# Patient Record
Sex: MALE | Race: ASIAN | Employment: FULL TIME | URBAN - METROPOLITAN AREA
[De-identification: names, ages, dates, MRNs, and addresses within clinical notes are randomized per-mention and may not be internally consistent; named-entity substitution may affect disease eponyms.]

---

## 2022-02-01 LAB — HBA1C MFR BLD HPLC: 6.3 %

## 2022-10-14 ENCOUNTER — TELEPHONE (OUTPATIENT)
Dept: INTERNAL MEDICINE CLINIC | Age: 52
End: 2022-10-14

## 2022-10-14 ENCOUNTER — OFFICE VISIT (OUTPATIENT)
Dept: INTERNAL MEDICINE CLINIC | Age: 52
End: 2022-10-14
Payer: COMMERCIAL

## 2022-10-14 VITALS
WEIGHT: 180 LBS | TEMPERATURE: 97.2 F | OXYGEN SATURATION: 99 % | HEART RATE: 72 BPM | DIASTOLIC BLOOD PRESSURE: 100 MMHG | BODY MASS INDEX: 27.28 KG/M2 | HEIGHT: 68 IN | SYSTOLIC BLOOD PRESSURE: 140 MMHG

## 2022-10-14 DIAGNOSIS — E11.69 TYPE 2 DIABETES MELLITUS WITH HYPERLIPIDEMIA (HCC): ICD-10-CM

## 2022-10-14 DIAGNOSIS — E78.5 TYPE 2 DIABETES MELLITUS WITH HYPERLIPIDEMIA (HCC): ICD-10-CM

## 2022-10-14 DIAGNOSIS — K14.6 SORENESS OF TONGUE: ICD-10-CM

## 2022-10-14 DIAGNOSIS — L03.012 PARONYCHIA OF LEFT MIDDLE FINGER: ICD-10-CM

## 2022-10-14 DIAGNOSIS — I10 ESSENTIAL HYPERTENSION: Primary | ICD-10-CM

## 2022-10-14 DIAGNOSIS — E78.2 MIXED HYPERLIPIDEMIA: ICD-10-CM

## 2022-10-14 PROCEDURE — 99213 OFFICE O/P EST LOW 20 MIN: CPT | Performed by: INTERNAL MEDICINE

## 2022-10-14 RX ORDER — LOSARTAN POTASSIUM 50 MG/1
50 TABLET ORAL DAILY
COMMUNITY

## 2022-10-14 RX ORDER — LOVASTATIN 10 MG/1
10 TABLET ORAL
COMMUNITY

## 2022-10-14 RX ORDER — METFORMIN HYDROCHLORIDE 500 MG/1
1500 TABLET, FILM COATED, EXTENDED RELEASE ORAL
COMMUNITY

## 2022-10-14 RX ORDER — METOPROLOL SUCCINATE 25 MG/1
25 TABLET, EXTENDED RELEASE ORAL DAILY
COMMUNITY
End: 2022-11-03

## 2022-10-14 RX ORDER — GLIMEPIRIDE 2 MG/1
2 TABLET ORAL
COMMUNITY

## 2022-10-14 RX ORDER — SIMVASTATIN 10 MG/1
10 TABLET, FILM COATED ORAL
COMMUNITY
End: 2022-10-14 | Stop reason: ALTCHOICE

## 2022-10-14 NOTE — TELEPHONE ENCOUNTER
Keo Cas is called and informed her  Albina Pedraza who has lesions in his mouth and requested an appointment. Appointment given for 200 today.

## 2022-10-14 NOTE — PROGRESS NOTES
Chief Complaint   Patient presents with    Mouth Pain    Follow Up Chronic Condition     1. Have you been to the ER, urgent care clinic since your last visit? Hospitalized since your last visit? No    2. Have you seen or consulted any other health care providers outside of the [address] [place] [address] since your last visit? Include any pap smears or colon screening.  No

## 2022-10-14 NOTE — PROGRESS NOTES
[address] [facility]  [address]  [place], [facility]  Phone: [phone]      Stevan Castro (: 1970) is a 50 y.o. male, established patient, here for evaluation of the following chief complaint(s):  Mouth Pain and Follow Up Chronic Condition         SUBJECTIVE/OBJECTIVE:  HPI:  Patient is here for tongue pain, he states that it started about a week ago. He has pain with hot/cold liquids and when he is brushing his tongue. He has not had 2nd COVID booster and no Flu shot for . He states that his sugar has been good and he would like to d/c some of diabetic medicines. He would like refills on his medicines. He has not had his blood pressure medicine today and he has been under some stress at work . Just took the medicine a few minutes back. Patient stated he had pain and swelling of the left and middle finger  3 weeks back[facility] with the doxy[facility]ine the pain and swelling is better. Prior to Admission medications    Medication Sig Start Date End Date Taking? Authorizing Provider   [facility] ER) 500 mg TG24 24 hour tablet Take 1,500 mg by mouth. Take 1 in the AM and 2 in the PM   Yes Provider, Historical   lovastatin (MEVACOR) 10 mg tablet Take 10 mg by mouth nightly. Yes Provider, Historical   glimepiride (AMARYL) 2 mg tablet Take 2 mg by mouth every morning. Yes Provider, Historical   metoprolol succinate (TOPROL-XL) 25 mg XL tablet Take 25 mg by mouth daily. Yes Provider, Historical   losartan (COZAAR) 50 mg tablet Take 50 mg by mouth daily. Yes Provider, Historical        No Known Allergies     Past Medical History:   Diagnosis Date    COVID     Essential hypertension     Mixed hyperlipidemia     Numbness of right hand     Type 2 diabetes mellitus with hyperlipidemia (HCC)         Family History   Problem Relation Age of Onset    Hypertension Mother         History reviewed.  No pertinent surgical history. Review of Systems   Constitutional:  Negative for chills and fever. HENT:  Negative for congestion, ear pain, nosebleeds, sinus pain, sore throat and tinnitus. Soreness over the tongue. Eyes:  Negative for redness. Respiratory:  Negative for cough and shortness of breath. Cardiovascular:  Negative for chest pain and palpitations. Gastrointestinal:  Negative for abdominal pain, diarrhea, nausea and vomiting. Endocrine: Negative for cold intolerance and polyuria. Genitourinary:  Negative for dysuria and hematuria. Musculoskeletal:  Negative for back pain and neck pain. Skin:  Negative for rash. The pain and swelling the nail fold of the left middle finger 3 weeks back, [facility] with [facility]xy[facility]ine   Neurological:  Negative for dizziness and headaches. Psychiatric/Behavioral:  Negative for suicidal ideas. The patient is not nervous/anxious. BP (!) 140/100 (BP 1 Location: Left upper arm, BP Patient Position: Sitting)   Pulse 72   Temp 97.2 °F (36.2 °C) (Temporal)   Ht 5' 8\" (1.727 m)   Wt 180 lb (81.6 kg)   SpO2 99%   BMI 27.37 kg/m²      Physical Exam  Constitutional:       Appearance: Normal appearance. HENT:      Head: Normocephalic and atraumatic. Right Ear: Tympanic membrane and external ear normal.      Left Ear: Tympanic membrane and external ear normal.      Nose: Nose normal.      Mouth/Throat:      Mouth: Mucous membranes are moist.      Comments: No ulcers noted over the buccal mucosa, mild loss of papilloma over the dorsum of the tongue. Eyes:      Extraocular Movements: Extraocular movements intact. Pupils: Pupils are equal, round, and reactive to light. Cardiovascular:      Rate and Rhythm: Normal rate. Pulmonary:      Effort: Pulmonary effort is normal.      Breath sounds: Normal breath sounds. Abdominal:      Palpations: Abdomen is soft. Tenderness: There is no abdominal tenderness. Musculoskeletal:      Cervical back: Normal range of motion and neck supple. Right lower leg: No edema. Left lower leg: No edema. Skin:     General: Skin is warm and dry. Comments: Minimal swelling lateral lateral nail fold of left middle finger, no redness no tenderness no drainage. Neurological:      General: No focal deficit present. Mental Status: He is alert and oriented to person, place, and time. Psychiatric:         Mood and Affect: Mood normal.       ASSESSMENT/PLAN:  Below is the assessment and plan developed based on review of pertinent history, physical exam, labs, studies, and medications. 1. Essential hypertension  Comments:  Pressure is elevated currently, patient just took his medication and is coming from work and has been under stress at work. Advised to continue current medicat  2. Soreness of tongue  Comments:  Soreness of the tongue is most likely due to the recent use of antibiotics, advised to use saline gargles and honey locally. 3. Type 2 diabetes mellitus with hyperlipidemia ([facility] [address]  Comments:  Advised to continue current medications, monitor blood sugar at home, will recheck A1c well controlled we will try to cut down medication. Orders:  -     HEMOGLOBIN A1C WITH EAG  -     MICROALBUMIN, UR, RAND W/ MICROALB/CREAT RATIO  -     METABOLIC PANEL, COMPREHENSIVE  4. Mixed hyperlipidemia  Comments:  Advised to continue low-fat diet, lovastatin, will recheck labs  Orders:  -     LIPID PANEL  5. Paronychia of left middle finger  Comments:  Resolving, had a treatment with doxycycline. Return in about 6 weeks (around 11/25/2022). There are no Patient Instructions on file for this visit.      Health Maintenance Due   Topic Date Due    Hepatitis C Screening  Never done    COVID-19 Vaccine (1) Never done    Pneumococcal 0-64 years (1 - PCV) Never done    Foot Exam Q1  Never done    A1C test (Diabetic or Prediabetic)  Never done    MICROALBUMIN Q1  Never done    Eye Exam Retinal or Dilated  Never done    Lipid Screen  Never done    Hepatitis B Vaccine (1 of 3 - Risk 3-dose series) Never done    DTaP/Tdap/Td series (1 - Tdap) Never done    Colorectal Cancer Screening Combo  Never done    Shingrix Vaccine Age 50> (1 of 2) Never done    Flu Vaccine (1) Never done        Aspects of this note may have been generated using voice recognition software. Despite editing, there may be unrecognized errors. An electronic signature was used to authenticate this note.   -- Maribel Vega MD

## 2022-11-03 RX ORDER — METOPROLOL SUCCINATE 25 MG/1
TABLET, EXTENDED RELEASE ORAL
Qty: 90 TABLET | Refills: 1 | Status: SHIPPED | OUTPATIENT
Start: 2022-11-03

## 2023-02-23 ENCOUNTER — OFFICE VISIT (OUTPATIENT)
Dept: INTERNAL MEDICINE CLINIC | Age: 53
End: 2023-02-23
Payer: COMMERCIAL

## 2023-02-23 VITALS — HEART RATE: 84 BPM | SYSTOLIC BLOOD PRESSURE: 148 MMHG | DIASTOLIC BLOOD PRESSURE: 100 MMHG

## 2023-02-23 DIAGNOSIS — Z12.11 COLON CANCER SCREENING: ICD-10-CM

## 2023-02-23 DIAGNOSIS — E78.2 MIXED HYPERLIPIDEMIA: ICD-10-CM

## 2023-02-23 DIAGNOSIS — E83.52 HYPERCALCEMIA: Primary | ICD-10-CM

## 2023-02-23 DIAGNOSIS — E11.9 ENCOUNTER FOR DIABETIC FOOT EXAM (HCC): ICD-10-CM

## 2023-02-23 DIAGNOSIS — R41.0 TRANSIENT CONFUSION: Primary | ICD-10-CM

## 2023-02-23 DIAGNOSIS — Z11.59 ENCOUNTER FOR HEPATITIS C SCREENING TEST FOR LOW RISK PATIENT: ICD-10-CM

## 2023-02-23 DIAGNOSIS — E11.69 TYPE 2 DIABETES MELLITUS WITH HYPERLIPIDEMIA (HCC): ICD-10-CM

## 2023-02-23 DIAGNOSIS — I10 ESSENTIAL HYPERTENSION: ICD-10-CM

## 2023-02-23 DIAGNOSIS — Z12.5 SCREENING PSA (PROSTATE SPECIFIC ANTIGEN): ICD-10-CM

## 2023-02-23 DIAGNOSIS — E78.5 TYPE 2 DIABETES MELLITUS WITH HYPERLIPIDEMIA (HCC): ICD-10-CM

## 2023-02-23 DIAGNOSIS — E83.52 HYPERCALCEMIA: ICD-10-CM

## 2023-02-23 PROBLEM — U07.1 COVID: Status: ACTIVE | Noted: 2023-02-23

## 2023-02-23 PROCEDURE — 99214 OFFICE O/P EST MOD 30 MIN: CPT | Performed by: INTERNAL MEDICINE

## 2023-02-23 PROCEDURE — 3077F SYST BP >= 140 MM HG: CPT | Performed by: INTERNAL MEDICINE

## 2023-02-23 PROCEDURE — 3080F DIAST BP >= 90 MM HG: CPT | Performed by: INTERNAL MEDICINE

## 2023-02-23 PROCEDURE — 3044F HG A1C LEVEL LT 7.0%: CPT | Performed by: INTERNAL MEDICINE

## 2023-02-23 RX ORDER — ASPIRIN 81 MG/1
81 TABLET ORAL DAILY
COMMUNITY

## 2023-02-23 RX ORDER — METOPROLOL SUCCINATE 50 MG/1
50 TABLET, EXTENDED RELEASE ORAL DAILY
Qty: 90 TABLET | Refills: 1 | Status: SHIPPED | OUTPATIENT
Start: 2023-02-23

## 2023-02-23 RX ORDER — ZOSTER VACCINE RECOMBINANT, ADJUVANTED 50 MCG/0.5
0.5 KIT INTRAMUSCULAR ONCE
Qty: 1 EACH | Refills: 1 | Status: SHIPPED | OUTPATIENT
Start: 2023-02-23 | End: 2023-02-23

## 2023-02-23 RX ORDER — LOSARTAN POTASSIUM 100 MG/1
100 TABLET ORAL DAILY
Qty: 90 TABLET | Refills: 1 | Status: SHIPPED | OUTPATIENT
Start: 2023-02-23

## 2023-02-23 NOTE — PROGRESS NOTES
Chief Complaint   Patient presents with    ED Follow-up    Follow Up Chronic Condition     1. \"Have you been to the ER, urgent care clinic since your last visit? Hospitalized since your last visit? \"  [facility]     2. \"Have you seen or consulted any other health care providers outside of the [address] [place] [address] since your last visit? \" No     3. For patients aged 39-70: Has the patient had a colonoscopy / FIT/ Cologuard? No      If the patient is female:    4. For patients aged 41-77: Has the patient had a mammogram within the past 2 years? NA - based on age or sex      11. For patients aged 21-65: Has the patient had a pap smear?  NA - based on age or sex

## 2023-02-23 NOTE — PROGRESS NOTES
[address] [facility]  [address]  [place], [facility]  Phone: [phone]      Elmo Stanley (: 1970) is a 50 y.o. male, established patient, here for evaluation of the following chief complaint(s):  ED Follow-up, Follow Up Chronic Condition, and Labs (Blood test at [facility] on 2023)         SUBJECTIVE/OBJECTIVE:  HPI:  Patient is here for ER discharge follow up, he was [facility] on 2023. He went to the ER for confusion. He did not know who his family was an felt generally confused. 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 A family friend came and checked his blood pressure and it was 190/102 him to the emergency room. It had resolved by the time he got the ER but not 100%. He had a consult with Dr. Lety Hogue and he had an EKG, CT head, and blood work. He was advised to start 81mg ASA every day until he could see his PCP. He has not had anymore confusion. He will see Dr. Lety Hogue in March. Is taking 100 mg of losartan for the last 2 weeks and blood pressure at home ranges from 130/80, 140/90  and he does need a refill on Losartan. Stated he has been under stress at work, and from school as he is doing [employer] course. Patient stated he forgot to take his blood pressure medications 3 to 4 days prior to the episode. Patient stated he had an appointment with Dr. Hernan Wright for colonoscopy but as he had to go for a training he had to to cancel that appointment. Patient stated he had a blood test on  at [facility]. Patient stated he is not taking Jardiance as he could not tolerate it. Prior to Admission medications    Medication Sig Start Date End Date Taking? Authorizing Provider   aspirin delayed-release 81 mg tablet Take 81 mg by mouth daily.    Yes Provider, Historical   varicella-zoster recombinant, PF, (Shingrix, PF,) 50 mcg/0.5 mL susr injection 0.5 mL by IntraMUSCular route once for 1 dose. 2/23/23 2/23/23 Yes Janessa Rodriguez MD   metoprolol succinate (TOPROL-XL) 50 mg XL tablet Take 1 Tablet by mouth daily. 2/23/23  Yes Janessa Rodriguez MD   losartan (COZAAR) 100 mg tablet Take 1 Tablet by mouth daily. 2/23/23  Yes Janessa Rodriguez MD   [facility] ER) 500 mg TG24 24 hour tablet Take 1,500 mg by mouth. Take 1 in the AM and 2 in the PM   Yes Provider, Historical   lovastatin (MEVACOR) 10 mg tablet Take 10 mg by mouth nightly. Yes Provider, Historical   glimepiride (AMARYL) 2 mg tablet Take 2 mg by mouth every morning. Yes Provider, Historical        No Known Allergies     Past Medical History:   Diagnosis Date    COVID     Essential hypertension     Mixed hyperlipidemia     Numbness of right hand     Type 2 diabetes mellitus with hyperlipidemia (HCC)         Family History   Problem Relation Age of Onset    Hypertension Mother         History reviewed. No pertinent surgical history. Review of Systems   Constitutional:  Negative for chills and fever. HENT:  Negative for congestion, ear pain, nosebleeds, sinus pain, sore throat and tinnitus. Eyes:  Negative for redness. Respiratory:  Negative for cough and shortness of breath. Cardiovascular:  Negative for chest pain and palpitations. Gastrointestinal:  Negative for abdominal pain, diarrhea, nausea and vomiting. Endocrine: Negative for cold intolerance and polyuria. Genitourinary:  Negative for dysuria and hematuria. Musculoskeletal:  Negative for back pain and neck pain. Skin:  Negative for rash. Neurological:  Negative for dizziness and headaches. Psychiatric/Behavioral: Negative. BP (!) 148/100   Pulse 84      Physical Exam  Constitutional:       General: He is not in acute distress. Appearance: Normal appearance. HENT:      Head: Normocephalic and atraumatic.       Right Ear: External ear normal.      Left Ear: External ear normal.      Nose: Nose normal.      Mouth/Throat:      Mouth: Mucous membranes are moist.   Eyes:      Extraocular Movements: Extraocular movements intact. Pupils: Pupils are equal, round, and reactive to light. Cardiovascular:      Rate and Rhythm: Normal rate and regular rhythm. Pulmonary:      Effort: Pulmonary effort is normal.      Breath sounds: Normal breath sounds. Abdominal:      General: Bowel sounds are normal.      Palpations: Abdomen is soft. Tenderness: There is no abdominal tenderness. Musculoskeletal:         General: Normal range of motion. Cervical back: Neck supple. Comments: Diabetic foot examination shows no rash over the feet, dorsalis pedis present bilaterally, touch sensation normal   Skin:     General: Skin is warm and dry. Neurological:      General: No focal deficit present. Mental Status: He is alert and oriented to person, place, and time. Psychiatric:         Mood and Affect: Mood normal.       ASSESSMENT/PLAN:  Below is the assessment and plan developed based on review of pertinent history, physical exam, labs, studies, and medications. 1. Transient confusion  Comments:  ER records reviewed, blood pressure was high at 218/115, CBC unremarkable PT/INR normal, glucose 172, calcium 11.5. CT head negative. Most probably the uncontrolled hypertension might have caused the transient confusion . Also his calcium was high at 11.5. Advised to continue aspirin and keep follow-up with Dr. Ariel Cantu.  2. Essential hypertension  Comments:  Blood pressure is still uncontrolled, advised to continue low-sodium diet, losartan 100 mg daily, will change metoprolol ER 25 to 50 mg daily and monitor blood pressure at home. To call with home blood pressure readings  3. Hypercalcemia  Comments:  Calcium was high at 11.5 in the emergency room and repeat lab at [facility] still shows elevated calcium at 11.7.   Patient is not on any calcium or vitamin D, advised advised to keep water intake, will check a PTH and urine 24-hour calcium  Orders:  -     CALCIUM; Future  -     PTH INTACT+CALCIUM, IONIZED  -     CALCIUM, UR, 24 HR; Future  4. Mixed hyperlipidemia  Comments:  Cholesterol 119, HDL 45, triglyceride 153, LDL 48, advised to continue low-fat diet and lovastatin  Orders:  -     METABOLIC PANEL, COMPREHENSIVE; Future  -     LIPID PANEL; Future  -     TSH 3RD GENERATION; Future  5. Type 2 diabetes mellitus with hyperlipidemia ([facility]  Comments:  Diabetes is fairly controlled with A1c of 6.9 but it is higher from previous A1c 6.3 in February 2022, patient could not tolerate Jardiance. Advised to continue diabetic diet, metformin and glimepiride. Orders:  -     HEMOGLOBIN A1C WITH EAG; Future  -     MICROALBUMIN, UR, RAND W/ MICROALB/CREAT RATIO; Future  6. Colon cancer screening  Comments:  Importance of colonoscopy discussed with the patient  7. Encounter for hepatitis C screening test for low risk patient  Comments: Will check hep C titer  Orders:  -     HEPATITIS C AB; Future  8. Encounter for diabetic foot exam ([facility]  9. Screening PSA (prostate specific antigen)  Comments:   will check PSA  Orders:  -     PSA SCREENING (SCREENING)      Return in about 5 months (around 7/23/2023). There are no Patient Instructions on file for this visit. Health Maintenance Due   Topic Date Due    Hepatitis C Screening  Never done    Pneumococcal 0-64 years (1 - PCV) Never done    Hepatitis B Vaccine (1 of 3 - Risk 3-dose series) Never done    Colorectal Cancer Screening Combo  Never done    Shingles Vaccine (1 of 2) Never done        Aspects of this note may have been generated using voice recognition software. Despite editing, there may be unrecognized errors. An electronic signature was used to authenticate this note.   -- Kvng Vital MD

## 2023-03-20 LAB
CA-I SERPL ISE-MCNC: 5.1 MG/DL (ref 4.5–5.6)
CALCIUM SERPL-MCNC: 8.9 MG/DL (ref 8.7–10.2)
PTH-INTACT SERPL-MCNC: 32 PG/ML (ref 15–65)

## 2023-05-24 ENCOUNTER — TELEMEDICINE (OUTPATIENT)
Facility: CLINIC | Age: 53
End: 2023-05-24
Payer: COMMERCIAL

## 2023-05-24 ENCOUNTER — TELEPHONE (OUTPATIENT)
Facility: CLINIC | Age: 53
End: 2023-05-24

## 2023-05-24 DIAGNOSIS — I10 ESSENTIAL (PRIMARY) HYPERTENSION: ICD-10-CM

## 2023-05-24 DIAGNOSIS — R05.9 COUGH IN ADULT: Primary | ICD-10-CM

## 2023-05-24 PROCEDURE — 99213 OFFICE O/P EST LOW 20 MIN: CPT | Performed by: INTERNAL MEDICINE

## 2023-05-24 RX ORDER — BENZONATATE 100 MG/1
100 CAPSULE ORAL 3 TIMES DAILY PRN
Qty: 45 CAPSULE | Refills: 0 | Status: SHIPPED | OUTPATIENT
Start: 2023-05-24 | End: 2023-06-08

## 2023-05-24 RX ORDER — AZELASTINE 1 MG/ML
1 SPRAY, METERED NASAL 2 TIMES DAILY
Qty: 30 ML | Refills: 0 | Status: SHIPPED | OUTPATIENT
Start: 2023-05-24

## 2023-05-24 SDOH — ECONOMIC STABILITY: INCOME INSECURITY: HOW HARD IS IT FOR YOU TO PAY FOR THE VERY BASICS LIKE FOOD, HOUSING, MEDICAL CARE, AND HEATING?: NOT HARD AT ALL

## 2023-05-24 SDOH — ECONOMIC STABILITY: FOOD INSECURITY: WITHIN THE PAST 12 MONTHS, THE FOOD YOU BOUGHT JUST DIDN'T LAST AND YOU DIDN'T HAVE MONEY TO GET MORE.: NEVER TRUE

## 2023-05-24 SDOH — ECONOMIC STABILITY: HOUSING INSECURITY
IN THE LAST 12 MONTHS, WAS THERE A TIME WHEN YOU DID NOT HAVE A STEADY PLACE TO SLEEP OR SLEPT IN A SHELTER (INCLUDING NOW)?: NO

## 2023-05-24 SDOH — ECONOMIC STABILITY: FOOD INSECURITY: WITHIN THE PAST 12 MONTHS, YOU WORRIED THAT YOUR FOOD WOULD RUN OUT BEFORE YOU GOT MONEY TO BUY MORE.: NEVER TRUE

## 2023-05-24 ASSESSMENT — ENCOUNTER SYMPTOMS
VOMITING: 0
DIARRHEA: 0
SHORTNESS OF BREATH: 0
NAUSEA: 0
COUGH: 1
ABDOMINAL PAIN: 0

## 2023-05-24 NOTE — PROGRESS NOTES
Cristy Hathaway (: 1970) is a 50 y.o. male, patient, here for evaluation of the following chief complaint(s):   Cough and Follow-up Chronic Condition     SUBJECTIVE/OBJECTIVE:  HPI:  Patient is being seen for a cough. He states that it is dry and no dyspnea or wheezing. He has been using hot water to help his cough. He denies any runny nose, sore throat, fever, chills, or diarrhea. This has been going on for a few weeks. He is not sure if it is from his blood pressure medicines. He has not had COVID testing. He stated he and his wife Eloise are going to [place] [facility] next week and she gets sick with nausea and vomiting while traveling. Would like to have a medicine to help with the vomiting  No Known Allergies  Current Outpatient Medications   Medication Sig Dispense Refill    azelastine (ASTELIN) 0.1 % nasal spray 1 spray by Nasal route 2 times daily Use in each nostril as directed 30 mL 0    benzonatate (TESSALON) 100 MG capsule Take 1 capsule by mouth 3 times daily as needed for Cough 45 capsule 0    aspirin 81 MG EC tablet Take 1 tablet by mouth daily      glimepiride (AMARYL) 2 MG tablet Take 1 tablet by mouth      losartan (COZAAR) 100 MG tablet Take 1 tablet by mouth daily      lovastatin (MEVACOR) 10 MG tablet Take 1 tablet by mouth nightly      metFORMIN, MOD, (GLUMETZA) 500 MG extended release tablet Take 3 tablets by mouth      metoprolol succinate (TOPROL XL) 50 MG extended release tablet Take 1 tablet by mouth daily       No current facility-administered medications for this visit.      Social History     Tobacco Use    Smoking status: Some Days     Packs/day: 0.50     Years: 15.00     Pack years: 7.50     Types: Cigarettes    Smokeless tobacco: Never   Vaping Use    Vaping Use: Never used   Substance Use Topics    Alcohol use: Yes    Drug use: Never      Past Medical History:   Diagnosis Date    COVID     Essential hypertension     Mixed hyperlipidemia     Numbness of right hand     Type 2 diabetes

## 2023-05-24 NOTE — PROGRESS NOTES
Chief Complaint   Patient presents with    Cough    Follow-up Chronic Condition         1. \"Have you been to the ER, urgent care clinic since your last visit? Hospitalized since your last visit? \" No    2. \"Have you seen or consulted any other health care providers outside of the [address] [place] [address] since your last visit? \" No     3. For patients aged 39-70: Has the patient had a colonoscopy / FIT/ Cologuard? No      If the patient is female:    4. For patients aged 41-77: Has the patient had a mammogram within the past 2 years? NA - based on age or sex      11. For patients aged 21-65: Has the patient had a pap smear?  NA - based on age or sex

## 2023-05-31 RX ORDER — GLIMEPIRIDE 2 MG/1
TABLET ORAL
Qty: 90 TABLET | Refills: 1 | Status: SHIPPED | OUTPATIENT
Start: 2023-05-31

## 2023-05-31 RX ORDER — LOVASTATIN 10 MG/1
TABLET ORAL
Qty: 90 TABLET | Refills: 1 | Status: SHIPPED | OUTPATIENT
Start: 2023-05-31

## 2023-05-31 RX ORDER — METFORMIN HYDROCHLORIDE 500 MG/1
TABLET, EXTENDED RELEASE ORAL
Qty: 270 TABLET | Refills: 1 | Status: SHIPPED | OUTPATIENT
Start: 2023-05-31

## 2023-08-02 ENCOUNTER — OFFICE VISIT (OUTPATIENT)
Facility: CLINIC | Age: 53
End: 2023-08-02
Payer: COMMERCIAL

## 2023-08-02 VITALS
HEART RATE: 76 BPM | WEIGHT: 186 LBS | BODY MASS INDEX: 28.19 KG/M2 | SYSTOLIC BLOOD PRESSURE: 140 MMHG | DIASTOLIC BLOOD PRESSURE: 100 MMHG | HEIGHT: 68 IN | TEMPERATURE: 98.8 F | OXYGEN SATURATION: 97 %

## 2023-08-02 DIAGNOSIS — R06.83 SNORING: ICD-10-CM

## 2023-08-02 DIAGNOSIS — E78.00 HYPERCHOLESTEROLEMIA: ICD-10-CM

## 2023-08-02 DIAGNOSIS — Z11.59 NEED FOR HEPATITIS C SCREENING TEST: ICD-10-CM

## 2023-08-02 DIAGNOSIS — E11.9 TYPE 2 DIABETES MELLITUS WITHOUT COMPLICATION, WITHOUT LONG-TERM CURRENT USE OF INSULIN (HCC): ICD-10-CM

## 2023-08-02 DIAGNOSIS — Z12.5 SCREENING PSA (PROSTATE SPECIFIC ANTIGEN): ICD-10-CM

## 2023-08-02 DIAGNOSIS — E83.52 HYPERCALCEMIA: ICD-10-CM

## 2023-08-02 DIAGNOSIS — I10 ESSENTIAL HYPERTENSION, BENIGN: Primary | ICD-10-CM

## 2023-08-02 DIAGNOSIS — M54.2 NECK PAIN: ICD-10-CM

## 2023-08-02 PROCEDURE — 99214 OFFICE O/P EST MOD 30 MIN: CPT | Performed by: INTERNAL MEDICINE

## 2023-08-02 PROCEDURE — 3080F DIAST BP >= 90 MM HG: CPT | Performed by: INTERNAL MEDICINE

## 2023-08-02 PROCEDURE — 3077F SYST BP >= 140 MM HG: CPT | Performed by: INTERNAL MEDICINE

## 2023-08-02 PROCEDURE — 3044F HG A1C LEVEL LT 7.0%: CPT | Performed by: INTERNAL MEDICINE

## 2023-08-02 RX ORDER — METHOCARBAMOL 500 MG/1
500 TABLET, FILM COATED ORAL 4 TIMES DAILY
Qty: 40 TABLET | Refills: 0 | Status: SHIPPED | OUTPATIENT
Start: 2023-08-02 | End: 2023-08-12

## 2023-08-02 RX ORDER — METOPROLOL SUCCINATE 50 MG/1
75 TABLET, EXTENDED RELEASE ORAL NIGHTLY
Qty: 135 TABLET | Refills: 1 | Status: SHIPPED | OUTPATIENT
Start: 2023-08-02

## 2023-08-02 ASSESSMENT — ENCOUNTER SYMPTOMS
ABDOMINAL PAIN: 0
COUGH: 0
SHORTNESS OF BREATH: 0
DIARRHEA: 0
VOMITING: 0
NAUSEA: 0

## 2023-08-02 NOTE — PROGRESS NOTES
No chief complaint on file. 1. \"Have you been to the ER, urgent care clinic since your last visit? Hospitalized since your last visit? \" No    2. \"Have you seen or consulted any other health care providers outside of the [address] [place] [address] since your last visit? \" No     3. For patients aged 43-73: Has the patient had a colonoscopy / FIT/ Cologuard? No      If the patient is female:    4. For patients aged 43-66: Has the patient had a mammogram within the past 2 years? NA - based on age or sex      11. For patients aged 21-65: Has the patient had a pap smear?  NA - based on age or sex
tenderness. Musculoskeletal:      Cervical back: Range of movements of neck normal..      Right lower leg: No edema. Left lower leg: No edema. Diabetic foot examination shows no deformity, dorsalis pedis brisk bilaterally and monofilament test is normal  Skin:     General: Skin is warm and dry. Neurological:      General: No focal deficit present. Mental Status: He is alert and oriented to person, place, and time. Psychiatric:         Mood and Affect: Mood normal.    ASSESSMENT/PLAN:  Below is the assessment and plan developed based on review of pertinent history, physical exam, labs, studies, and medications. 1. Essential hypertension, benign  Comments:  Blood pressure is elevated, will change metoprolol to 75 mg at night, to continue losartan in the morning, low-sodium diet and to monitor blood pressure at home  2. Hypercholesterolemia  Comments:  Well-controlled with LDL of 48 and total cholesterol of 119 in February 23, advised to continue low-fat diet and lovastatin, will recheck labs  Orders:  -     CBC with Auto Differential; Future  -     Comprehensive Metabolic Panel; Future  -     Lipid Panel; Future  -     TSH; Future  3. Type 2 diabetes mellitus without complication, without long-term current use of insulin (HCC)  Comments:  Fairly controlled with A1c 6.9, advised to continue diabetic diet, metformin, glimepiride and will recheck labs  Orders:  -     Hemoglobin A1C; Future  -     Microalbumin / Creatinine Urine Ratio; Future  4. Neck pain  Comments:  Likely musculoskeletal pain, patient is status post Ortho consult, advised heat and topical oil, will try methocarbamol if not better will consider MRI  Orders:  -     methocarbamol (ROBAXIN) 500 MG tablet; Take 1 tablet by mouth 4 times daily for 10 days, Disp-40 tablet, R-0Normal  5.  Hypercalcemia  Comments:  Calcium was high at 11.7 in February, repeat calcium was better at 8.9 and PTH normal at 32 in March 23, advised to keep water

## 2023-11-13 RX ORDER — METOPROLOL SUCCINATE 50 MG/1
50 TABLET, EXTENDED RELEASE ORAL DAILY
Qty: 90 TABLET | Refills: 1 | Status: SHIPPED | OUTPATIENT
Start: 2023-11-13

## 2023-11-13 RX ORDER — LOSARTAN POTASSIUM 100 MG/1
100 TABLET ORAL DAILY
Qty: 90 TABLET | Refills: 1 | Status: SHIPPED | OUTPATIENT
Start: 2023-11-13

## 2024-03-01 ENCOUNTER — OFFICE VISIT (OUTPATIENT)
Facility: CLINIC | Age: 54
End: 2024-03-01
Payer: COMMERCIAL

## 2024-03-01 VITALS — DIASTOLIC BLOOD PRESSURE: 104 MMHG | SYSTOLIC BLOOD PRESSURE: 162 MMHG | HEART RATE: 68 BPM

## 2024-03-01 DIAGNOSIS — E11.9 TYPE 2 DIABETES MELLITUS WITHOUT COMPLICATION, WITHOUT LONG-TERM CURRENT USE OF INSULIN (HCC): ICD-10-CM

## 2024-03-01 DIAGNOSIS — E55.9 VITAMIN D DEFICIENCY: ICD-10-CM

## 2024-03-01 DIAGNOSIS — I10 ESSENTIAL HYPERTENSION, BENIGN: ICD-10-CM

## 2024-03-01 DIAGNOSIS — R07.9 CHEST PAIN, UNSPECIFIED TYPE: Primary | ICD-10-CM

## 2024-03-01 DIAGNOSIS — E78.00 HYPERCHOLESTEROLEMIA: ICD-10-CM

## 2024-03-01 PROBLEM — R41.0 TRANSIENT CONFUSION: Status: ACTIVE | Noted: 2023-02-03

## 2024-03-01 PROCEDURE — 3080F DIAST BP >= 90 MM HG: CPT | Performed by: INTERNAL MEDICINE

## 2024-03-01 PROCEDURE — 99214 OFFICE O/P EST MOD 30 MIN: CPT | Performed by: INTERNAL MEDICINE

## 2024-03-01 PROCEDURE — 3077F SYST BP >= 140 MM HG: CPT | Performed by: INTERNAL MEDICINE

## 2024-03-01 RX ORDER — OLMESARTAN MEDOXOMIL, AMLODIPINE AND HYDROCHLOROTHIAZIDE TABLET 40/5/12.5 MG 40; 5; 12.5 MG/1; MG/1; MG/1
1 TABLET ORAL DAILY
Qty: 90 TABLET | Refills: 0 | Status: SHIPPED | OUTPATIENT
Start: 2024-03-01

## 2024-03-01 ASSESSMENT — PATIENT HEALTH QUESTIONNAIRE - PHQ9
2. FEELING DOWN, DEPRESSED OR HOPELESS: 0
SUM OF ALL RESPONSES TO PHQ9 QUESTIONS 1 & 2: 0
1. LITTLE INTEREST OR PLEASURE IN DOING THINGS: 0
SUM OF ALL RESPONSES TO PHQ QUESTIONS 1-9: 0

## 2024-03-01 NOTE — PROGRESS NOTES
Chief Complaint   Patient presents with    Follow-up Chronic Condition    Hypertension         1. \"Have you been to the ER, urgent care clinic since your last visit?  Hospitalized since your last visit?\" No    2. \"Have you seen or consulted any other health care providers outside of the [facility] since your last visit?\" No     3. For patients aged 45-75: Has the patient had a colonoscopy / FIT/ Cologuard? No      If the patient is female:    4. For patients aged 40-74: Has the patient had a mammogram within the past 2 years? NA - based on age or sex      5. For patients aged 21-65: Has the patient had a pap smear? NA - based on age or sex

## 2024-03-01 NOTE — PROGRESS NOTES
[facility]  [address] [place], [place] [address]  Phone: [phone]      Florentino Bolivar (: 1970) is a 53 y.o. male, established patient, here for evaluation of the following chief complaint(s):  Follow-up Chronic Condition, Hypertension, and Chest Pain     SUBJECTIVE/OBJECTIVE:  HPI:  Patient is here for follow up, he did not have routine blood work for this visit. He states that his blood pressure has been elevated at home ,was 170/110 range for the past 2 weeks. He states that he has upper back, neck, and shoulder pain and headache. No nausea or vomiting. He is taking all of his medicines. He is taking 150mg of Losartan. Had Covid 2 weeks back with mild symptoms and got better.Sugar has been around 120 to 130 and denies any low blood sugar readings.He stated he saw Dr Donis,had stress test and was negative. Had sleep study,has sleep apnea and has follow with Dr Da Silva for CPAP. Vitamin D was low in the past and had taken Vitamin D tablets.    Hemoglobin A1C   Date Value Ref Range Status   2023 6.9 (H) 4.8 - 5.6 % Final     Comment:              Prediabetes: 5.7 - 6.4           Diabetes: >6.4           Glycemic control for adults with diabetes: <7.0        Creatinine   Date Value Ref Range Status   2023 0.75 (L) 0.76 - 1.27 mg/dL Final     Glucose   Date Value Ref Range Status   2023 111 (H) 70 - 99 mg/dL Final     Potassium   Date Value Ref Range Status   2023 4.8 3.5 - 5.2 mmol/L Final     Cholesterol, Total   Date Value Ref Range Status   2023 119 100 - 199 mg/dL Final     HDL   Date Value Ref Range Status   2023 45 >39 mg/dL Final     LDL Calculated   Date Value Ref Range Status   2023 48 0 - 99 mg/dL Final     Triglycerides   Date Value Ref Range Status   2023 153 (H) 0 - 149 mg/dL Final     No results found for: \"HAV\", \"HEPAIGM\", \"HEPBIGM\", \"HEPBCAB\", \"HBEAG\", \"HEPCAB\"     Current Outpatient Medications

## 2024-04-29 RX ORDER — GLIMEPIRIDE 2 MG/1
TABLET ORAL
Qty: 90 TABLET | Refills: 1 | Status: SHIPPED | OUTPATIENT
Start: 2024-04-29

## 2024-04-29 RX ORDER — LOVASTATIN 10 MG/1
TABLET ORAL
Qty: 90 TABLET | Refills: 1 | Status: SHIPPED | OUTPATIENT
Start: 2024-04-29

## 2024-05-25 RX ORDER — METFORMIN HYDROCHLORIDE 500 MG/1
TABLET, EXTENDED RELEASE ORAL
Qty: 270 TABLET | Refills: 1 | Status: SHIPPED | OUTPATIENT
Start: 2024-05-25

## 2024-05-29 ENCOUNTER — OFFICE VISIT (OUTPATIENT)
Facility: CLINIC | Age: 54
End: 2024-05-29
Payer: COMMERCIAL

## 2024-05-29 VITALS
HEART RATE: 69 BPM | OXYGEN SATURATION: 99 % | DIASTOLIC BLOOD PRESSURE: 82 MMHG | WEIGHT: 191 LBS | TEMPERATURE: 98.2 F | HEIGHT: 68 IN | BODY MASS INDEX: 28.95 KG/M2 | SYSTOLIC BLOOD PRESSURE: 132 MMHG

## 2024-05-29 DIAGNOSIS — E11.9 TYPE 2 DIABETES MELLITUS WITHOUT COMPLICATION, WITHOUT LONG-TERM CURRENT USE OF INSULIN (HCC): ICD-10-CM

## 2024-05-29 DIAGNOSIS — E55.9 VITAMIN D DEFICIENCY: ICD-10-CM

## 2024-05-29 DIAGNOSIS — Z12.5 SCREENING PSA (PROSTATE SPECIFIC ANTIGEN): ICD-10-CM

## 2024-05-29 DIAGNOSIS — Z00.01 ENCOUNTER FOR ANNUAL GENERAL MEDICAL EXAMINATION WITH ABNORMAL FINDINGS IN ADULT: Primary | ICD-10-CM

## 2024-05-29 DIAGNOSIS — Z12.11 COLON CANCER SCREENING: ICD-10-CM

## 2024-05-29 DIAGNOSIS — E78.00 HYPERCHOLESTEROLEMIA: ICD-10-CM

## 2024-05-29 DIAGNOSIS — M54.2 NECK PAIN: ICD-10-CM

## 2024-05-29 DIAGNOSIS — E83.52 HYPERCALCEMIA: ICD-10-CM

## 2024-05-29 DIAGNOSIS — G47.33 OSA (OBSTRUCTIVE SLEEP APNEA): ICD-10-CM

## 2024-05-29 DIAGNOSIS — Z00.01 ENCOUNTER FOR ANNUAL GENERAL MEDICAL EXAMINATION WITH ABNORMAL FINDINGS IN ADULT: ICD-10-CM

## 2024-05-29 DIAGNOSIS — R49.0 HOARSENESS OF VOICE: ICD-10-CM

## 2024-05-29 DIAGNOSIS — I10 ESSENTIAL HYPERTENSION, BENIGN: ICD-10-CM

## 2024-05-29 PROBLEM — E11.69 TYPE 2 DIABETES MELLITUS WITH HYPERLIPIDEMIA (HCC): Status: RESOLVED | Noted: 2022-10-14 | Resolved: 2024-05-29

## 2024-05-29 PROBLEM — E78.5 TYPE 2 DIABETES MELLITUS WITH HYPERLIPIDEMIA (HCC): Status: RESOLVED | Noted: 2022-10-14 | Resolved: 2024-05-29

## 2024-05-29 PROCEDURE — 99396 PREV VISIT EST AGE 40-64: CPT | Performed by: INTERNAL MEDICINE

## 2024-05-29 PROCEDURE — 3075F SYST BP GE 130 - 139MM HG: CPT | Performed by: INTERNAL MEDICINE

## 2024-05-29 PROCEDURE — 3079F DIAST BP 80-89 MM HG: CPT | Performed by: INTERNAL MEDICINE

## 2024-05-29 RX ORDER — BACLOFEN 5 MG/1
5 TABLET ORAL NIGHTLY PRN
Qty: 30 TABLET | Refills: 0 | Status: SHIPPED | OUTPATIENT
Start: 2024-05-29

## 2024-05-29 RX ORDER — ZOSTER VACCINE RECOMBINANT, ADJUVANTED 50 MCG/0.5
0.5 KIT INTRAMUSCULAR SEE ADMIN INSTRUCTIONS
Qty: 0.5 ML | Refills: 0 | Status: SHIPPED | OUTPATIENT
Start: 2024-05-29 | End: 2024-11-25

## 2024-05-29 SDOH — ECONOMIC STABILITY: FOOD INSECURITY: WITHIN THE PAST 12 MONTHS, YOU WORRIED THAT YOUR FOOD WOULD RUN OUT BEFORE YOU GOT MONEY TO BUY MORE.: NEVER TRUE

## 2024-05-29 SDOH — ECONOMIC STABILITY: FOOD INSECURITY: WITHIN THE PAST 12 MONTHS, THE FOOD YOU BOUGHT JUST DIDN'T LAST AND YOU DIDN'T HAVE MONEY TO GET MORE.: NEVER TRUE

## 2024-05-29 SDOH — ECONOMIC STABILITY: INCOME INSECURITY: HOW HARD IS IT FOR YOU TO PAY FOR THE VERY BASICS LIKE FOOD, HOUSING, MEDICAL CARE, AND HEATING?: NOT HARD AT ALL

## 2024-05-29 ASSESSMENT — ENCOUNTER SYMPTOMS
VOICE CHANGE: 1
DIARRHEA: 0
COUGH: 0
ABDOMINAL PAIN: 0
SHORTNESS OF BREATH: 0
VOMITING: 0
NAUSEA: 0

## 2024-05-29 NOTE — PROGRESS NOTES
.  Chief Complaint   Patient presents with    Follow-up Chronic Condition     .  \"Have you been to the ER, urgent care clinic since your last visit?  Hospitalized since your last visit?\"    NO    “Have you seen or consulted any other health care providers outside of [facility] since your last visit?”    YES - When: approximately 2 months ago.  Where and Why: Dr. Garnica for sleep apnea.        “Have you had a colorectal cancer screening such as a colonoscopy/FIT/Cologuard?    NO    No colonoscopy on file  No cologuard on file  No FIT/FOBT on file   No flexible sigmoidoscopy on file     ./82 (Site: Left Upper Arm, Position: Sitting, Cuff Size: Medium Adult)   Pulse 69   Temp 98.2 °F (36.8 °C) (Oral)   Ht 1.727 m (5' 8\")   Wt 86.6 kg (191 lb)   SpO2 99%   BMI 29.04 kg/m²       Click Here for Release of Records Request

## 2024-05-29 NOTE — PROGRESS NOTES
[facility]  [address], [place] [address]  Phone: [phone]      Florentino Bolivar (: 1970) is a 54 y.o. male, established patient, here for evaluation of the following chief complaint(s):  Follow-up Chronic Condition and Annual Exam     SUBJECTIVE/OBJECTIVE:  HPI:  54-year-old male with a past medical history hypertension, hypercholesterolemia, diabetes mellitus is seen for annual physical.  Patient states that he is planning to do his blood test today.  He stated he feels since he is on the new blood pressure medication his blood pressure has been much better.  Has neck pain which has been going on for couple of years.  Tried heat ice and ibuprofen as needed which helps a little bit.  Feels stiffness in the back of the neck and shoulders.  Patient stated he did not do colonoscopy and has appointment with Dr. Franco on Traci 3, 2024.  Had  sleep study, has sleep apnea and is waiting for the CPAP machine.  He is working in [place].    Hemoglobin A1C   Date Value Ref Range Status   2023 6.9 (H) 4.8 - 5.6 % Final     Comment:              Prediabetes: 5.7 - 6.4           Diabetes: >6.4           Glycemic control for adults with diabetes: <7.0        Creatinine   Date Value Ref Range Status   2023 0.75 (L) 0.76 - 1.27 mg/dL Final     Glucose   Date Value Ref Range Status   2023 111 (H) 70 - 99 mg/dL Final     Potassium   Date Value Ref Range Status   2023 4.8 3.5 - 5.2 mmol/L Final     Cholesterol, Total   Date Value Ref Range Status   2023 119 100 - 199 mg/dL Final     HDL   Date Value Ref Range Status   2023 45 >39 mg/dL Final     Triglycerides   Date Value Ref Range Status   2023 153 (H) 0 - 149 mg/dL Final     No results found for: \"HAV\", \"HEPAIGM\", \"HEPBIGM\", \"HEPBCAB\", \"HBEAG\", \"HEPCAB\"       Current Outpatient Medications   Medication Sig    zoster recombinant adjuvanted vaccine (SHINGRIX) 50 MCG/0.5ML

## 2024-06-24 RX ORDER — OLMESARTAN MEDOXOMIL, AMLODIPINE AND HYDROCHLOROTHIAZIDE TABLET 40/5/12.5 MG 40; 5; 12.5 MG/1; MG/1; MG/1
1 TABLET ORAL DAILY
Qty: 90 TABLET | Refills: 0 | Status: SHIPPED | OUTPATIENT
Start: 2024-06-24

## 2024-09-18 RX ORDER — METOPROLOL SUCCINATE 50 MG/1
50 TABLET, EXTENDED RELEASE ORAL DAILY
Qty: 90 TABLET | Refills: 1 | Status: SHIPPED | OUTPATIENT
Start: 2024-09-18

## 2024-09-29 LAB
ALBUMIN SERPL-MCNC: 4.5 G/DL (ref 3.8–4.9)
ALBUMIN/CREAT UR: 6 MG/G CREAT (ref 0–29)
ALP SERPL-CCNC: 89 IU/L (ref 44–121)
ALT SERPL-CCNC: 29 IU/L (ref 0–44)
APPEARANCE UR: CLEAR
AST SERPL-CCNC: 15 IU/L (ref 0–40)
BASOPHILS # BLD AUTO: 0 X10E3/UL (ref 0–0.2)
BASOPHILS NFR BLD AUTO: 0 %
BILIRUB SERPL-MCNC: <0.2 MG/DL (ref 0–1.2)
BILIRUB UR QL STRIP: NEGATIVE
BUN SERPL-MCNC: 25 MG/DL (ref 6–24)
BUN/CREAT SERPL: 32 (ref 9–20)
CALCIUM SERPL-MCNC: 9.5 MG/DL (ref 8.7–10.2)
CHLORIDE SERPL-SCNC: 104 MMOL/L (ref 96–106)
CHOLEST SERPL-MCNC: 125 MG/DL (ref 100–199)
CO2 SERPL-SCNC: 22 MMOL/L (ref 20–29)
COLOR UR: YELLOW
CREAT SERPL-MCNC: 0.78 MG/DL (ref 0.76–1.27)
CREAT UR-MCNC: 144.9 MG/DL
EGFRCR SERPLBLD CKD-EPI 2021: 107 ML/MIN/1.73
EOSINOPHIL # BLD AUTO: 0.1 X10E3/UL (ref 0–0.4)
EOSINOPHIL NFR BLD AUTO: 2 %
ERYTHROCYTE [DISTWIDTH] IN BLOOD BY AUTOMATED COUNT: 13.2 % (ref 11.6–15.4)
GLOBULIN SER CALC-MCNC: 2.4 G/DL (ref 1.5–4.5)
GLUCOSE SERPL-MCNC: 182 MG/DL (ref 70–99)
GLUCOSE UR QL STRIP: ABNORMAL
HCT VFR BLD AUTO: 39.2 % (ref 37.5–51)
HDLC SERPL-MCNC: 37 MG/DL
HGB BLD-MCNC: 12.9 G/DL (ref 13–17.7)
HGB UR QL STRIP: NEGATIVE
IMM GRANULOCYTES # BLD AUTO: 0 X10E3/UL (ref 0–0.1)
IMM GRANULOCYTES NFR BLD AUTO: 1 %
KETONES UR QL STRIP: NEGATIVE
LDLC SERPL CALC-MCNC: 51 MG/DL (ref 0–99)
LEUKOCYTE ESTERASE UR QL STRIP: NEGATIVE
LYMPHOCYTES # BLD AUTO: 2.3 X10E3/UL (ref 0.7–3.1)
LYMPHOCYTES NFR BLD AUTO: 36 %
MCH RBC QN AUTO: 31.2 PG (ref 26.6–33)
MCHC RBC AUTO-ENTMCNC: 32.9 G/DL (ref 31.5–35.7)
MCV RBC AUTO: 95 FL (ref 79–97)
MICROALBUMIN UR-MCNC: 8.5 UG/ML
MONOCYTES # BLD AUTO: 0.5 X10E3/UL (ref 0.1–0.9)
MONOCYTES NFR BLD AUTO: 8 %
NEUTROPHILS # BLD AUTO: 3.5 X10E3/UL (ref 1.4–7)
NEUTROPHILS NFR BLD AUTO: 53 %
NITRITE UR QL STRIP: NEGATIVE
PH UR STRIP: 5.5 [PH] (ref 5–7.5)
PLATELET # BLD AUTO: 245 X10E3/UL (ref 150–450)
POTASSIUM SERPL-SCNC: 4.3 MMOL/L (ref 3.5–5.2)
PROT SERPL-MCNC: 6.9 G/DL (ref 6–8.5)
PROT UR QL STRIP: NEGATIVE
PSA SERPL-MCNC: 0.7 NG/ML (ref 0–4)
RBC # BLD AUTO: 4.13 X10E6/UL (ref 4.14–5.8)
SODIUM SERPL-SCNC: 140 MMOL/L (ref 134–144)
SP GR UR STRIP: >=1.03 (ref 1–1.03)
TRIGL SERPL-MCNC: 235 MG/DL (ref 0–149)
TSH SERPL DL<=0.005 MIU/L-ACNC: 1.58 UIU/ML (ref 0.45–4.5)
UROBILINOGEN UR STRIP-MCNC: 0.2 MG/DL (ref 0.2–1)
VLDLC SERPL CALC-MCNC: 37 MG/DL (ref 5–40)
WBC # BLD AUTO: 6.5 X10E3/UL (ref 3.4–10.8)

## 2024-09-30 LAB
25(OH)D3+25(OH)D2 SERPL-MCNC: 20 NG/ML (ref 30–100)
HBA1C MFR BLD: 8.3 % (ref 4.8–5.6)

## 2024-10-08 ENCOUNTER — OFFICE VISIT (OUTPATIENT)
Facility: CLINIC | Age: 54
End: 2024-10-08
Payer: COMMERCIAL

## 2024-10-08 VITALS
DIASTOLIC BLOOD PRESSURE: 86 MMHG | OXYGEN SATURATION: 99 % | HEART RATE: 76 BPM | WEIGHT: 192 LBS | SYSTOLIC BLOOD PRESSURE: 136 MMHG | HEIGHT: 68 IN | BODY MASS INDEX: 29.1 KG/M2 | TEMPERATURE: 97.6 F | RESPIRATION RATE: 16 BRPM

## 2024-10-08 DIAGNOSIS — D64.9 MILD ANEMIA: ICD-10-CM

## 2024-10-08 DIAGNOSIS — E11.9 TYPE 2 DIABETES MELLITUS WITHOUT COMPLICATION, WITHOUT LONG-TERM CURRENT USE OF INSULIN (HCC): Primary | ICD-10-CM

## 2024-10-08 DIAGNOSIS — E78.00 HYPERCHOLESTEROLEMIA: ICD-10-CM

## 2024-10-08 PROCEDURE — 3079F DIAST BP 80-89 MM HG: CPT | Performed by: INTERNAL MEDICINE

## 2024-10-08 PROCEDURE — 99214 OFFICE O/P EST MOD 30 MIN: CPT | Performed by: INTERNAL MEDICINE

## 2024-10-08 PROCEDURE — 3052F HG A1C>EQUAL 8.0%<EQUAL 9.0%: CPT | Performed by: INTERNAL MEDICINE

## 2024-10-08 PROCEDURE — 3075F SYST BP GE 130 - 139MM HG: CPT | Performed by: INTERNAL MEDICINE

## 2024-10-08 RX ORDER — ZOSTER VACCINE RECOMBINANT, ADJUVANTED 50 MCG/0.5
0.5 KIT INTRAMUSCULAR SEE ADMIN INSTRUCTIONS
Qty: 0.5 ML | Refills: 0 | Status: SHIPPED | OUTPATIENT
Start: 2024-10-08 | End: 2025-04-06

## 2024-10-08 ASSESSMENT — ENCOUNTER SYMPTOMS
NAUSEA: 0
ABDOMINAL PAIN: 0
COUGH: 0
VOMITING: 0
DIARRHEA: 0
SHORTNESS OF BREATH: 0

## 2024-10-08 NOTE — PROGRESS NOTES
[facility]  [address], [place] [address]  Phone: [phone]      Florentino Bolivar (: 1970) is a 54 y.o. male, established patient, here for evaluation of the following chief complaint(s):  Follow-up Chronic Condition and Discuss Labs       SUBJECTIVE/OBJECTIVE:  History of Present Illness  The patient is a 54-year-old male with a past medical history of hypertension, hypercholesterolemia, type 2 diabetes, sleep apnea. He is seen today for follow-up of his blood test done on 2024 and evaluation of diabetes and hypertension.    He has been monitoring his blood sugar levels, which occasionally rise above the normal range of 140 to 150, particularly when he misses his medication.  Patient stated he has been missing his diabetes medicine for many days as he has not been taking it when his sugar is around 130 to 40 he is currently taking metformin and glimepiride for his diabetes management.    He is using his CPAP machine for sleep apnea.    He stated he gained some weight recently and restarted back on his exercise regimen.  Had a colonoscopy in August with Dr. Wall and was advised to repeat in 10 years.    He denies any abdominal pain, nausea vomiting diarrhea or urinary symptoms.    Had seen Dr. Howard last year but did not see him this year.        Hemoglobin A1C   Date Value Ref Range Status   2024 8.3 (H) 4.8 - 5.6 % Final     Comment:                 Prediabetes: 5.7 - 6.4           Diabetes: >6.4           Glycemic control for adults with diabetes: <7.0        Hemoglobin   Date Value Ref Range Status   2024 12.9 (L) 13.0 - 17.7 g/dL Final     Hematocrit   Date Value Ref Range Status   2024 39.2 37.5 - 51.0 % Final     Creatinine   Date Value Ref Range Status   2024 0.78 0.76 - 1.27 mg/dL Final     Glucose   Date Value Ref Range Status   2024 182 (H) 70 - 99 mg/dL Final     TSH   Date Value Ref Range Status

## 2024-10-08 NOTE — PROGRESS NOTES
Chief Complaint   Patient presents with    Follow-up Chronic Condition         BP (!) 144/84 (Site: Left Upper Arm, Position: Sitting)   Pulse 76   Temp 97.6 °F (36.4 °C)   Resp 16   Ht 1.727 m (5' 8\")   Wt 87.1 kg (192 lb)   SpO2 99%   BMI 29.19 kg/m²         \"Have you been to the ER, urgent care clinic since your last visit?  Hospitalized since your last visit?\"    NO    “Have you seen or consulted any other health care providers outside our system since your last visit?”    NO      “Have you had a colorectal cancer screening such as a colonoscopy/FIT/Cologuard?    YES - Type: Colonoscopy - Where: [facility] Nurse/[facility] to request most recent records if not in the chart     No colonoscopy on file  No cologuard on file  No FIT/FOBT on file   No flexible sigmoidoscopy on file     “Have you had a diabetic eye exam?”    YES - Where: Dr. Howard Nurse/CMA to request most recent records if not in the chart     Date of last diabetic eye exam: 6/2/2023

## 2024-10-30 RX ORDER — OLMESARTAN MEDOXOMIL, AMLODIPINE AND HYDROCHLOROTHIAZIDE TABLET 40/5/12.5 MG 40; 5; 12.5 MG/1; MG/1; MG/1
1 TABLET ORAL DAILY
Qty: 90 TABLET | Refills: 0 | Status: SHIPPED | OUTPATIENT
Start: 2024-10-30

## 2024-11-18 ENCOUNTER — TELEPHONE (OUTPATIENT)
Facility: CLINIC | Age: 54
End: 2024-11-18

## 2024-11-18 DIAGNOSIS — M25.569 ACUTE KNEE PAIN, UNSPECIFIED LATERALITY: Primary | ICD-10-CM

## 2024-11-18 NOTE — TELEPHONE ENCOUNTER
Patient called stating that he has severe knee pain and wants to have an order placed for S. Uric Acid level?

## 2025-01-08 DIAGNOSIS — E78.00 HYPERCHOLESTEROLEMIA: ICD-10-CM

## 2025-01-08 DIAGNOSIS — D64.9 MILD ANEMIA: ICD-10-CM

## 2025-01-08 DIAGNOSIS — E11.9 TYPE 2 DIABETES MELLITUS WITHOUT COMPLICATION, WITHOUT LONG-TERM CURRENT USE OF INSULIN (HCC): ICD-10-CM

## 2025-01-28 RX ORDER — LOVASTATIN 10 MG/1
10 TABLET ORAL NIGHTLY
Qty: 90 TABLET | Refills: 1 | Status: SHIPPED | OUTPATIENT
Start: 2025-01-28

## 2025-01-28 RX ORDER — METFORMIN HYDROCHLORIDE 500 MG/1
TABLET, EXTENDED RELEASE ORAL
Qty: 270 TABLET | Refills: 1 | Status: SHIPPED | OUTPATIENT
Start: 2025-01-28

## 2025-01-28 RX ORDER — OLMESARTAN MEDOXOMIL, AMLODIPINE AND HYDROCHLOROTHIAZIDE TABLET 40/5/12.5 MG 40; 5; 12.5 MG/1; MG/1; MG/1
1 TABLET ORAL DAILY
Qty: 90 TABLET | Refills: 0 | Status: SHIPPED | OUTPATIENT
Start: 2025-01-28

## 2025-01-28 RX ORDER — GLIMEPIRIDE 2 MG/1
TABLET ORAL
Qty: 90 TABLET | Refills: 1 | Status: SHIPPED | OUTPATIENT
Start: 2025-01-28

## 2025-03-03 RX ORDER — GLUCOSAMINE HCL/CHONDROITIN SU 500-400 MG
1 CAPSULE ORAL
COMMUNITY
End: 2025-03-03 | Stop reason: SDUPTHER

## 2025-03-03 RX ORDER — GLUCOSAMINE HCL/CHONDROITIN SU 500-400 MG
1 CAPSULE ORAL 2 TIMES DAILY
Qty: 100 STRIP | Refills: 5 | Status: SHIPPED | OUTPATIENT
Start: 2025-03-03

## 2025-03-03 NOTE — TELEPHONE ENCOUNTER
The patient's wife called the office asking about the glucose test strips I informed that there or no test strips listed in his med list and we need the name of them and she stated that  has filled it before and would like to speak to a nurse

## 2025-03-05 DIAGNOSIS — E11.9 TYPE 2 DIABETES MELLITUS WITHOUT COMPLICATION, WITHOUT LONG-TERM CURRENT USE OF INSULIN (HCC): Primary | ICD-10-CM

## 2025-03-05 NOTE — TELEPHONE ENCOUNTER
The patient's wife called stating the the test strips that were sent in are not compatible wit his machine he has the ultra one touch 2 meter so he needs the one tough ultra test strips sent in to his pharmacy

## 2025-04-08 LAB
ALBUMIN SERPL-MCNC: 4.6 G/DL (ref 3.8–4.9)
ALP SERPL-CCNC: 84 IU/L (ref 44–121)
ALT SERPL-CCNC: 21 IU/L (ref 0–44)
AST SERPL-CCNC: 13 IU/L (ref 0–40)
BASOPHILS # BLD AUTO: 0 X10E3/UL (ref 0–0.2)
BASOPHILS NFR BLD AUTO: 0 %
BILIRUB SERPL-MCNC: 0.4 MG/DL (ref 0–1.2)
BUN SERPL-MCNC: 18 MG/DL (ref 6–24)
BUN/CREAT SERPL: 25 (ref 9–20)
CALCIUM SERPL-MCNC: 10.1 MG/DL (ref 8.7–10.2)
CHLORIDE SERPL-SCNC: 105 MMOL/L (ref 96–106)
CHOLEST SERPL-MCNC: 119 MG/DL (ref 100–199)
CO2 SERPL-SCNC: 21 MMOL/L (ref 20–29)
CREAT SERPL-MCNC: 0.72 MG/DL (ref 0.76–1.27)
EGFRCR SERPLBLD CKD-EPI 2021: 110 ML/MIN/1.73
EOSINOPHIL # BLD AUTO: 0.1 X10E3/UL (ref 0–0.4)
EOSINOPHIL NFR BLD AUTO: 1 %
ERYTHROCYTE [DISTWIDTH] IN BLOOD BY AUTOMATED COUNT: 13.1 % (ref 11.6–15.4)
GLOBULIN SER CALC-MCNC: 2.4 G/DL (ref 1.5–4.5)
GLUCOSE SERPL-MCNC: 176 MG/DL (ref 70–99)
HBA1C MFR BLD: 9.2 % (ref 4.8–5.6)
HCT VFR BLD AUTO: 38.5 % (ref 37.5–51)
HDLC SERPL-MCNC: 39 MG/DL
HGB BLD-MCNC: 12.7 G/DL (ref 13–17.7)
IMM GRANULOCYTES # BLD AUTO: 0 X10E3/UL (ref 0–0.1)
IMM GRANULOCYTES NFR BLD AUTO: 0 %
LDLC SERPL CALC-MCNC: 46 MG/DL (ref 0–99)
LYMPHOCYTES # BLD AUTO: 3.2 X10E3/UL (ref 0.7–3.1)
LYMPHOCYTES NFR BLD AUTO: 40 %
MCH RBC QN AUTO: 29.9 PG (ref 26.6–33)
MCHC RBC AUTO-ENTMCNC: 33 G/DL (ref 31.5–35.7)
MCV RBC AUTO: 91 FL (ref 79–97)
MONOCYTES # BLD AUTO: 0.5 X10E3/UL (ref 0.1–0.9)
MONOCYTES NFR BLD AUTO: 6 %
NEUTROPHILS # BLD AUTO: 4.1 X10E3/UL (ref 1.4–7)
NEUTROPHILS NFR BLD AUTO: 53 %
PLATELET # BLD AUTO: 260 X10E3/UL (ref 150–450)
POTASSIUM SERPL-SCNC: 4.7 MMOL/L (ref 3.5–5.2)
PROT SERPL-MCNC: 7 G/DL (ref 6–8.5)
RBC # BLD AUTO: 4.25 X10E6/UL (ref 4.14–5.8)
SODIUM SERPL-SCNC: 142 MMOL/L (ref 134–144)
TRIGL SERPL-MCNC: 212 MG/DL (ref 0–149)
VLDLC SERPL CALC-MCNC: 34 MG/DL (ref 5–40)
WBC # BLD AUTO: 7.9 X10E3/UL (ref 3.4–10.8)

## 2025-04-09 ENCOUNTER — OFFICE VISIT (OUTPATIENT)
Facility: CLINIC | Age: 55
End: 2025-04-09
Payer: COMMERCIAL

## 2025-04-09 VITALS
HEIGHT: 68 IN | HEART RATE: 77 BPM | SYSTOLIC BLOOD PRESSURE: 132 MMHG | DIASTOLIC BLOOD PRESSURE: 85 MMHG | OXYGEN SATURATION: 99 % | WEIGHT: 192 LBS | BODY MASS INDEX: 29.1 KG/M2

## 2025-04-09 DIAGNOSIS — E11.9 TYPE 2 DIABETES MELLITUS WITHOUT COMPLICATION, WITHOUT LONG-TERM CURRENT USE OF INSULIN: Primary | ICD-10-CM

## 2025-04-09 DIAGNOSIS — D64.9 MILD ANEMIA: ICD-10-CM

## 2025-04-09 DIAGNOSIS — I10 ESSENTIAL HYPERTENSION, BENIGN: ICD-10-CM

## 2025-04-09 DIAGNOSIS — Z12.5 SCREENING PSA (PROSTATE SPECIFIC ANTIGEN): ICD-10-CM

## 2025-04-09 DIAGNOSIS — E78.00 HYPERCHOLESTEROLEMIA: ICD-10-CM

## 2025-04-09 DIAGNOSIS — E11.9 TYPE 2 DIABETES MELLITUS WITHOUT COMPLICATION, WITHOUT LONG-TERM CURRENT USE OF INSULIN: ICD-10-CM

## 2025-04-09 DIAGNOSIS — G47.33 OSA (OBSTRUCTIVE SLEEP APNEA): ICD-10-CM

## 2025-04-09 PROCEDURE — 3075F SYST BP GE 130 - 139MM HG: CPT | Performed by: INTERNAL MEDICINE

## 2025-04-09 PROCEDURE — 3046F HEMOGLOBIN A1C LEVEL >9.0%: CPT | Performed by: INTERNAL MEDICINE

## 2025-04-09 PROCEDURE — 99214 OFFICE O/P EST MOD 30 MIN: CPT | Performed by: INTERNAL MEDICINE

## 2025-04-09 PROCEDURE — 3079F DIAST BP 80-89 MM HG: CPT | Performed by: INTERNAL MEDICINE

## 2025-04-09 RX ORDER — TIRZEPATIDE 2.5 MG/.5ML
2.5 INJECTION, SOLUTION SUBCUTANEOUS
Qty: 2 ML | Refills: 3 | Status: SHIPPED | OUTPATIENT
Start: 2025-04-09

## 2025-04-09 RX ORDER — AVOBENZONE, HOMOSALATE, OCTISALATE, OCTOCRYLENE 30; 40; 45; 26 MG/ML; MG/ML; MG/ML; MG/ML
1 CREAM TOPICAL DAILY
Qty: 100 EACH | Refills: 5 | Status: SHIPPED | OUTPATIENT
Start: 2025-04-09

## 2025-04-09 SDOH — ECONOMIC STABILITY: FOOD INSECURITY: WITHIN THE PAST 12 MONTHS, THE FOOD YOU BOUGHT JUST DIDN'T LAST AND YOU DIDN'T HAVE MONEY TO GET MORE.: NEVER TRUE

## 2025-04-09 SDOH — ECONOMIC STABILITY: FOOD INSECURITY: WITHIN THE PAST 12 MONTHS, YOU WORRIED THAT YOUR FOOD WOULD RUN OUT BEFORE YOU GOT MONEY TO BUY MORE.: NEVER TRUE

## 2025-04-09 ASSESSMENT — PATIENT HEALTH QUESTIONNAIRE - PHQ9
2. FEELING DOWN, DEPRESSED OR HOPELESS: NOT AT ALL
SUM OF ALL RESPONSES TO PHQ QUESTIONS 1-9: 0
SUM OF ALL RESPONSES TO PHQ QUESTIONS 1-9: 0
1. LITTLE INTEREST OR PLEASURE IN DOING THINGS: NOT AT ALL
SUM OF ALL RESPONSES TO PHQ QUESTIONS 1-9: 0
SUM OF ALL RESPONSES TO PHQ QUESTIONS 1-9: 0

## 2025-04-09 ASSESSMENT — ENCOUNTER SYMPTOMS
DIARRHEA: 0
NAUSEA: 0
VOMITING: 0
SHORTNESS OF BREATH: 0
ABDOMINAL PAIN: 0
COUGH: 0

## 2025-04-09 NOTE — PROGRESS NOTES
.  Chief Complaint   Patient presents with    Follow-up    Discuss Labs     \"Have you been to the ER, urgent care clinic since your last visit?  Hospitalized since your last visit?\"    NO    “Have you seen or consulted any other health care providers outside our system since your last visit?”    NO    “Have you had a colorectal cancer screening such as a colonoscopy/FIT/Cologuard?    NO    No colonoscopy on file  No cologuard on file  No FIT/FOBT on file   No flexible sigmoidoscopy on file     “Have you had a diabetic eye exam?”    NO     Date of last diabetic eye exam: 6/2/2023     ./85 (BP Site: Left Upper Arm, Patient Position: Sitting, BP Cuff Size: Medium Adult)   Pulse 77   Ht 1.727 m (5' 8\")   Wt 87.1 kg (192 lb)   SpO2 99%   BMI 29.19 kg/m²     
lancets has also been provided.    2. Hypertension.  His blood pressure is currently well-controlled.  He will continue his current medications, including olmesartan and metoprolol.  Blood pressure monitoring shows good control.  No changes in medication are required at this time.    3. Hypercholesterolemia.  His total cholesterol and LDL levels are within normal limits, but his triglycerides are elevated, likely due to his diabetes.  He will continue his current regimen of lovastatin and adhere to a low-fat diet while reducing sweet intake.  Cholesterol levels are being monitored regularly.  Dietary counseling provided to help manage triglyceride levels.    4. Mild Anemia.  His hemoglobin level remains low at 12.9. He underwent a colonoscopy in August of last year and was normal.  He will start taking an iron supplement and incorporate more green leafy vegetables into his diet.  Anemia is being monitored through regular blood tests.  Dietary recommendations provided to increase iron intake.    5. Obstructive Sleep Apnea.  He is not currently using his CPAP machine due to discomfort.  He is advised to discuss this with his sleep specialist during his next appointment to adjust the settings or mask type.  Sleep apnea management is critical for overall health.  Counseling provided on the importance of using CPAP for blood pressure and energy levels.    1. Type 2 diabetes mellitus without complication, without long-term current use of insulin  -     Tirzepatide (MOUNJARO) 2.5 MG/0.5ML SOAJ; Inject 2.5 mg into the skin every 7 days, Disp-2 mL, R-3Normal  -     Comprehensive Metabolic Panel; Future  -     Albumin/Creatinine Ratio, Urine; Future  -     Hemoglobin A1C; Future  2. Essential hypertension, benign  3. Hypercholesterolemia  -     Lipid Panel; Future  -     TSH; Future  4. VON (obstructive sleep apnea)  5. Mild anemia  -     CBC with Auto Differential; Future  6. Screening PSA (prostate specific

## 2025-06-11 RX ORDER — METOPROLOL SUCCINATE 50 MG/1
50 TABLET, EXTENDED RELEASE ORAL DAILY
Qty: 90 TABLET | Refills: 1 | Status: SHIPPED | OUTPATIENT
Start: 2025-06-11

## 2025-06-11 RX ORDER — OLMESARTAN MEDOXOMIL, AMLODIPINE AND HYDROCHLOROTHIAZIDE TABLET 40/5/12.5 MG 40; 5; 12.5 MG/1; MG/1; MG/1
1 TABLET ORAL DAILY
Qty: 90 TABLET | Refills: 1 | Status: SHIPPED | OUTPATIENT
Start: 2025-06-11

## 2025-08-19 ENCOUNTER — PATIENT MESSAGE (OUTPATIENT)
Facility: CLINIC | Age: 55
End: 2025-08-19